# Patient Record
(demographics unavailable — no encounter records)

---

## 2025-04-25 NOTE — REVIEW OF SYSTEMS
[Feeling Fatigued] : feeling fatigued [Syncope] : syncope [Negative] : Musculoskeletal [Weight Gain (___ Lbs)] : no recent weight gain [Weight Loss (___ Lbs)] : no recent weight loss [SOB] : no shortness of breath [Dyspnea on exertion] : not dyspnea during exertion [Leg Claudication] : no intermittent leg claudication [Palpitations] : no palpitations

## 2025-04-25 NOTE — ADDENDUM
[FreeTextEntry1] : Mino Pritchard assisted in documentation on Apr 25 2025 acting as a scribe for Dr. Boaz Chadwick.

## 2025-04-25 NOTE — DISCUSSION/SUMMARY
[FreeTextEntry1] : Classic vasovagal syncope   Lvef 55% Trace MR  salt water liberalization  decrease etoh intake  reassurance e meds if he has recurrent syncope  [EKG obtained to assist in diagnosis and management of assessed problem(s)] : EKG obtained to assist in diagnosis and management of assessed problem(s)